# Patient Record
Sex: FEMALE | Race: BLACK OR AFRICAN AMERICAN | Employment: UNEMPLOYED | URBAN - METROPOLITAN AREA
[De-identification: names, ages, dates, MRNs, and addresses within clinical notes are randomized per-mention and may not be internally consistent; named-entity substitution may affect disease eponyms.]

---

## 2024-08-23 ENCOUNTER — APPOINTMENT (OUTPATIENT)
Dept: ULTRASOUND IMAGING | Age: 24
End: 2024-08-23

## 2024-08-23 ENCOUNTER — HOSPITAL ENCOUNTER (EMERGENCY)
Age: 24
Discharge: HOME | End: 2024-08-23

## 2024-08-23 VITALS
SYSTOLIC BLOOD PRESSURE: 122 MMHG | DIASTOLIC BLOOD PRESSURE: 77 MMHG | HEART RATE: 72 BPM | OXYGEN SATURATION: 99 % | WEIGHT: 132.94 LBS | TEMPERATURE: 98 F | RESPIRATION RATE: 14 BRPM

## 2024-08-23 DIAGNOSIS — B96.89 BV (BACTERIAL VAGINOSIS): Primary | ICD-10-CM

## 2024-08-23 DIAGNOSIS — N93.8 DUB (DYSFUNCTIONAL UTERINE BLEEDING): ICD-10-CM

## 2024-08-23 DIAGNOSIS — N76.0 BV (BACTERIAL VAGINOSIS): Primary | ICD-10-CM

## 2024-08-23 LAB
BACTERIA GENITAL QL WET PREP: ABNORMAL
BACTERIA URNS QL MICRO: ABNORMAL /HPF
BILIRUB UR QL STRIP.AUTO: NEGATIVE
CLARITY UR: CLEAR
CLUE CELLS SPEC QL WET PREP: ABNORMAL
COLOR UR: YELLOW
EPI CELLS #/AREA URNS AUTO: 7 /HPF (ref 0–5)
EPI CELLS SPEC QL WET PREP: ABNORMAL
GLUCOSE UR STRIP.AUTO-MCNC: NEGATIVE MG/DL
HCG UR QL: NEGATIVE
HGB UR QL STRIP.AUTO: ABNORMAL
HYALINE CASTS #/AREA URNS AUTO: 1 /LPF (ref 0–8)
KETONES UR STRIP.AUTO-MCNC: NEGATIVE MG/DL
LEUKOCYTE ESTERASE UR QL STRIP.AUTO: ABNORMAL
MUCUS: PRESENT
NITRITE UR QL STRIP.AUTO: NEGATIVE
PH UR STRIP.AUTO: 5.5 [PH] (ref 5–8)
PROT UR STRIP.AUTO-MCNC: ABNORMAL MG/DL
RBC CLUMPS #/AREA URNS AUTO: 135 /HPF (ref 0–4)
RBC SPEC QL WET PREP: ABNORMAL
SP GR UR STRIP.AUTO: 1.02 (ref 1–1.03)
SPECIMEN SOURCE FLD: ABNORMAL
T VAGINALIS GENITAL QL WET PREP: ABNORMAL
UA COMPLETE W REFLEX CULTURE PNL UR: ABNORMAL
UA DIPSTICK W REFLEX MICRO PNL UR: YES
URN SPEC COLLECT METH UR: ABNORMAL
UROBILINOGEN UR STRIP-ACNC: 1 E.U./DL
WBC #/AREA URNS AUTO: 3 /HPF (ref 0–5)
WBC SPEC QL WET PREP: ABNORMAL
YEAST GENITAL QL WET PREP: ABNORMAL

## 2024-08-23 PROCEDURE — 87491 CHLMYD TRACH DNA AMP PROBE: CPT

## 2024-08-23 PROCEDURE — 76830 TRANSVAGINAL US NON-OB: CPT

## 2024-08-23 PROCEDURE — 84703 CHORIONIC GONADOTROPIN ASSAY: CPT

## 2024-08-23 PROCEDURE — 76856 US EXAM PELVIC COMPLETE: CPT

## 2024-08-23 PROCEDURE — 87210 SMEAR WET MOUNT SALINE/INK: CPT

## 2024-08-23 PROCEDURE — 93975 VASCULAR STUDY: CPT

## 2024-08-23 PROCEDURE — 81001 URINALYSIS AUTO W/SCOPE: CPT

## 2024-08-23 PROCEDURE — 99284 EMERGENCY DEPT VISIT MOD MDM: CPT

## 2024-08-23 PROCEDURE — 87591 N.GONORRHOEAE DNA AMP PROB: CPT

## 2024-08-23 RX ORDER — METRONIDAZOLE 500 MG/1
500 TABLET ORAL 2 TIMES DAILY
Qty: 14 TABLET | Refills: 0 | Status: SHIPPED | OUTPATIENT
Start: 2024-08-23 | End: 2024-08-30

## 2024-08-23 ASSESSMENT — PAIN - FUNCTIONAL ASSESSMENT
PAIN_FUNCTIONAL_ASSESSMENT: NONE - DENIES PAIN
PAIN_FUNCTIONAL_ASSESSMENT: NONE - DENIES PAIN

## 2024-08-23 NOTE — ED PROVIDER NOTES
[facility] EMERGENCY DEPARTMENT  EMERGENCY DEPARTMENT ENCOUNTER        Pt Name: Cathleen Montiel  MRN: [id number]  Birthdate 2000  Date of evaluation: 8/23/2024  Provider: MARIA E Antunez CNP  PCP: No primary care provider on file.  Note Started: 3:21 PM EDT 8/23/24      NATALI. I have evaluated this patient.        CHIEF COMPLAINT       Chief Complaint   Patient presents with    Vaginal Itching     Patient states she has yeast infection that is not going away despite treatment as well as some dysuria    Vaginal Bleeding     Bleeding and cramping that she does not suspect to be her period       HISTORY OF PRESENT ILLNESS: 1 or more Elements     History From: Pt    Limitations to history : None    Social Determinants Significantly Affecting Health : Patient does not have insurance    Chief Complaint:above    Cathleen Montiel is a 24 y.o. female who  has no past medical history on file. presents due to concern for vaginal itching similar problem for the past 2 years on and off.  She tells me she is about 15 months postpartum and this is problem started when she was pregnant.  Her gynecologist gave her some Monistat which provide brief relief however she would like to get some answers today.  Also reports abnormal bleeding on her.  With some crampy abdominal pain.  She has a Nexplanon that is been in place for the past year.  Tells me she moved here from [place] and has not seen a family doctor here as she does not have insurance.    The patient   smokes, drinks, denies illicits    Nursing Notes were all reviewed and agreed with or any disagreements were addressed in the HPI.    REVIEW OF SYSTEMS :      Review of Systems    Positives and Pertinent negatives as per HPI.     SURGICAL HISTORY   History reviewed. No pertinent surgical history.    CURRENTMEDICATIONS       Discharge Medication List as of 8/23/2024  5:41 PM          ALLERGIES     Patient has no known allergies.    FAMILYHISTORY

## 2024-08-23 NOTE — DISCHARGE INSTRUCTIONS
You were tested today for STIs. You were treated today for BV.     Your Chlamydia and Gonorrhea test results should be back within 1-2 days and you will be contacted at the confidential number you gave us today.     If any of the results come back positive you should contact all of your current and recent sexual partners so that they can get tested and treated.    You should abstain from sex for at least 2 weeks or until all symptoms have resolved so as not to infect others.     It is very important that you follow up with your regular doctor or an STI clinic for further follow up, reevaluation, and testing (HIV, Hepatitis, syphilis, etc) in the future.   You may also follow up with the [facility] for STI testing, call [phone] for an appointment.    Use condoms with all sexual partners!

## 2024-08-26 LAB
C TRACH DNA CVX QL NAA+PROBE: NEGATIVE
N GONORRHOEA DNA CERV MUCUS QL NAA+PROBE: NEGATIVE